# Patient Record
Sex: FEMALE | Race: WHITE | NOT HISPANIC OR LATINO | Employment: UNEMPLOYED | URBAN - METROPOLITAN AREA
[De-identification: names, ages, dates, MRNs, and addresses within clinical notes are randomized per-mention and may not be internally consistent; named-entity substitution may affect disease eponyms.]

---

## 2021-03-03 DIAGNOSIS — Z23 NEED FOR VACCINATION: ICD-10-CM

## 2022-01-24 ENCOUNTER — HOSPITAL ENCOUNTER (EMERGENCY)
Facility: MEDICAL CENTER | Age: 67
End: 2022-01-24
Attending: EMERGENCY MEDICINE
Payer: MEDICARE

## 2022-01-24 VITALS
RESPIRATION RATE: 16 BRPM | WEIGHT: 260 LBS | OXYGEN SATURATION: 98 % | BODY MASS INDEX: 41.78 KG/M2 | HEART RATE: 88 BPM | DIASTOLIC BLOOD PRESSURE: 70 MMHG | SYSTOLIC BLOOD PRESSURE: 135 MMHG | HEIGHT: 66 IN | TEMPERATURE: 97.3 F

## 2022-01-24 DIAGNOSIS — F22 PARANOID DELUSION (HCC): ICD-10-CM

## 2022-01-24 DIAGNOSIS — R45.851 SUICIDAL IDEATION: ICD-10-CM

## 2022-01-24 DIAGNOSIS — Z86.59 HISTORY OF BIPOLAR DISORDER: ICD-10-CM

## 2022-01-24 LAB
AMPHET UR QL SCN: NEGATIVE
BARBITURATES UR QL SCN: NEGATIVE
BENZODIAZ UR QL SCN: NEGATIVE
BZE UR QL SCN: NEGATIVE
CANNABINOIDS UR QL SCN: NEGATIVE
METHADONE UR QL SCN: NEGATIVE
OPIATES UR QL SCN: NEGATIVE
OXYCODONE UR QL SCN: NEGATIVE
PCP UR QL SCN: NEGATIVE
POC BREATHALIZER: 0 PERCENT (ref 0–0.01)
PROPOXYPH UR QL SCN: NEGATIVE
SARS-COV+SARS-COV-2 AG RESP QL IA.RAPID: NOTDETECTED
SPECIMEN SOURCE: NORMAL

## 2022-01-24 PROCEDURE — A9270 NON-COVERED ITEM OR SERVICE: HCPCS | Performed by: EMERGENCY MEDICINE

## 2022-01-24 PROCEDURE — 700102 HCHG RX REV CODE 250 W/ 637 OVERRIDE(OP): Performed by: EMERGENCY MEDICINE

## 2022-01-24 PROCEDURE — 87426 SARSCOV CORONAVIRUS AG IA: CPT

## 2022-01-24 PROCEDURE — 90791 PSYCH DIAGNOSTIC EVALUATION: CPT

## 2022-01-24 PROCEDURE — 99285 EMERGENCY DEPT VISIT HI MDM: CPT

## 2022-01-24 PROCEDURE — 302970 POC BREATHALIZER: Performed by: EMERGENCY MEDICINE

## 2022-01-24 PROCEDURE — 80307 DRUG TEST PRSMV CHEM ANLYZR: CPT

## 2022-01-24 PROCEDURE — 302970 POC BREATHALIZER

## 2022-01-24 RX ORDER — NICOTINE 21 MG/24HR
1 PATCH, TRANSDERMAL 24 HOURS TRANSDERMAL ONCE
Status: DISCONTINUED | OUTPATIENT
Start: 2022-01-24 | End: 2022-01-24 | Stop reason: HOSPADM

## 2022-01-24 RX ORDER — IBUPROFEN 600 MG/1
600 TABLET ORAL ONCE
Status: COMPLETED | OUTPATIENT
Start: 2022-01-24 | End: 2022-01-24

## 2022-01-24 RX ORDER — LORAZEPAM 1 MG/1
1 TABLET ORAL ONCE
Status: COMPLETED | OUTPATIENT
Start: 2022-01-24 | End: 2022-01-24

## 2022-01-24 RX ORDER — HALOPERIDOL 5 MG/1
5 TABLET ORAL ONCE
Status: COMPLETED | OUTPATIENT
Start: 2022-01-24 | End: 2022-01-24

## 2022-01-24 RX ADMIN — HALOPERIDOL 5 MG: 5 TABLET ORAL at 10:24

## 2022-01-24 RX ADMIN — NICOTINE TRANSDERMAL SYSTEM 21 MG: 21 PATCH, EXTENDED RELEASE TRANSDERMAL at 10:21

## 2022-01-24 RX ADMIN — IBUPROFEN 600 MG: 600 TABLET ORAL at 16:27

## 2022-01-24 RX ADMIN — LORAZEPAM 1 MG: 1 TABLET ORAL at 10:24

## 2022-01-24 ASSESSMENT — ENCOUNTER SYMPTOMS
FEVER: 0
ABDOMINAL PAIN: 0
NERVOUS/ANXIOUS: 1
COUGH: 0
DEPRESSION: 1
HEADACHES: 0

## 2022-01-24 NOTE — DISCHARGE PLANNING
Legal Hold Transfer     Referral: Legal hold transfer to Mental Health Facility     Intervention: Notified by  Bernardo that patient has been accepted to [facility]     Pt's accepting physcian is Dr. No     Transport arranged through [facility]     The pt will be picked up at 1800      Notified Bedside RN, Alert Team RN and ALVIN Chandler of the departure time as well as accepting facility.      Transfer packet created with original legal hold and placed on chart.      Plan: Pt will be transferring to [facility] today at 1800 via [facility].

## 2022-01-24 NOTE — ED NOTES
Security made this RN aware. Pt's belongings total 3 bags. One pt belongings bag/purse of some sort and luggage bag in and outside of locker 11.

## 2022-01-24 NOTE — ED TRIAGE NOTES
Chief Complaint   Patient presents with   • Suicidal Ideation     [place] that [place] was attempting to fly to [place], paraniod thoughts and SI.

## 2022-01-24 NOTE — CONSULTS
"[facility]   TRIAGE ASSESSMENT    Name: Coty Baptiste  MRN: [id number]  : 1955  Age: 66 y.o.  Date of assessment: 2022  PCP: Pcp Pt States None  Persons in attendance: Patient  Patient Location: [facility]    CHIEF COMPLAINT/PRESENTING ISSUE  Chief Complaint   Patient presents with   • Suicidal Ideation     [place] report that pt was attempting to fly to [place], paraniod thoughts and SI.        Per LH initiated by PD for inability to care for self:  \"Female reported her  worked for [employer] and she needed to 'disappear.'  Female stated she needed to fly to [place].  Spoke with subjects daughter and ex , who advised subject was bipolar and had similar incidents of traveling to [place] and [place] and going missing for days.  Subject was unable to purchase an airline ticket and advised if she was unable to travel she or someone else would end up dead.  Subject advised she had the means to harm herself or others and would do so if it came to it.  Subject suffering for auditory hallucinations and believes her phone and bank account are being hacked.  Subject asked me multiple times to put a bullet in her head.\"  Pt noted to make \"erratic statements\" to bedside RN, required redirection to not wander from her ER room and to wear her mask.     Upon my evaluation, pt tangential and paranoid; a+ox4, denies SI, HI and hallucinations.    Attempted to discuss LH with pt, who said the police who wrote it are lying and \"in on it.\"  She references several times during our discussion that she is the target of police working with malevolent forces against her. Says she and her family are targets d/t her sister being in [employer], something she also mentioned multiple times.  She was unable to tell me how she tends to basic tenets of caring for herself; too paranoid and tangential to answer most of my questions succinctly.  She would " "not answer when I asked her about asking the officer to shoot her, only saying \"he's lying.  He's in on and I guess you are too.\"    CURRENT LIVING SITUATION/SOCIAL SUPPORT/FINANCIAL RESOURCES: Pt currently a poor and reluctant historian; paranoid delusions.  Address on facesheet lists address in [place] and unemployed.    Of note, from chart review:  Minimal past encounters in this EMR.  First encounter 2004; presented to ER with multiple medical complaints, including chest pain.  Said [facility] [employer] [facility].  \" She states also that she has been  exposed to mold at the [facility], they had three patient's die at  the [facility] after being in a special room and now they are investigating  the room and that part of the hospital for mold.  She wanted us to check  her for mold.  The other thing is she said that she was exposed to somebody  with TB recently and she is supposed to get a TB test for this and  requested us to do that.  She also stated that she was not sure whether she  was supposed to be in the Emergency Department or not, that she had been  told that she was supposed to go to [facility] for these things, but  she opted today to come to the Emergency Department.\"  Np psych hx noted.  Eloped.  2015: ER for medical.  Hx anxiety noted; no psych meds in home med list.    BEHAVIORAL HEALTH/SUBSTANCE USE TREATMENT HISTORY  Does patient/parent report a history of prior behavioral health/substance use treatment for patient?   None noted in minimal past charting.  Pt currently too paranoid to divulge much personal hx.  Pt's daughter reported to PD pt has hx of bipolar d/o and has had past similar incidents to today.    SAFETY ASSESSMENT - SELF  Does patient acknowledge current or past symptoms of dangerousness to self or is previous history noted? Pt refused to acknowledge.  Does parent/significant other report patient has current or past symptoms of dangerousness to " "self? Yes; per LH by PD, pt threatened to harm herself and asked officer several times to shoot her.  Does presenting problem suggest symptoms of dangerousness to self? Yes:    Pt currently unable/unwilling to give background information; she says PD is lying about her voicing SI, that they are part of some sort of cover up.    SAFETY ASSESSMENT - OTHERS  Does patient acknowledge current or past symptoms of aggressive behavior or risk to others or is previous history noted? no  Does parent/significant other report patient has current or past symptoms of aggressive behavior or risk to others?  Yes, per LH pt voiced vague ideation to harm others \"if it came down to it\" r/t her delusion about PD.  Does presenting problem suggest symptoms of dangerousness to others? Vaguely; no direct threats and she denies it completely; no  needed at this time.    LEGAL HISTORY  Does patient acknowledge history of arrest/correction/[facility] or is previous history noted? Not assessed    Crisis Safety Plan completed and copy given to patient? N\A    ABUSE/NEGLECT SCREENING  Does patient report feeling “unsafe” in his/her home, or afraid of anyone?  Yes; noted likely paranoia  Does patient report any history of physical, sexual, or emotional abuse?  Not assessed  Does parent or significant other report any of the above? N\A  Is there evidence of neglect by self?  no  Is there evidence of neglect by a caregiver? n/a  Does the patient/parent report any history of CPS/APS/police involvement related to suspected abuse/neglect or domestic violence? no  Based on the information provided during the current assessment, is a mandated report of suspected abuse/neglect being made?  No    SUBSTANCE USE SCREENING  Pt denies.  etoh 0.0  UDS not sent yet      MENTAL STATUS   Participation: Limited verbal participation, Guarded, Defensive and Resistant  Grooming: Casual  Orientation: Alert, Fully Oriented and Evidence of delusions " present  Behavior: Agitated and Tense  Eye contact: Intense  Mood: Anxious, Angry, Manic and Irritable  Affect: Anxious and Angry  Thought process: Tangential, Flight of ideas and Perseveration  Thought content: Preoccupation, Rumination, Evidence of delusion and Paranoia  Speech: Volume within normal limits, Pressured and Hypertalkative  Perception: Within normal limits  Memory:  Poor memory for chronology of events  Insight: Poor  Judgment:  Poor  Other:    Collateral information:   Source:  [] Significant other present in person:   [] Significant other by telephone  [] [facility]   [] [facility]  [x] [facility]  [x] Other: legal hold from PD      CLINICAL IMPRESSIONS:  Primary:  Unable to care for self  Secondary:  SI       IDENTIFIED NEEDS/PLAN:  [Trigger DISPOSITION list for any items marked]    [x]  Imminent safety risk - self [] Imminent safety risk - others   []  Acute substance withdrawal [x]  Psychosis/Impaired reality testing   [x]  Mood/anxiety []  Substance use/Addictive behavior   [x]  Maladaptive behaviro []  Parent/child conflict   [x]  Family/Couples conflict []  Biomedical   []  Housing []  Financial   []   Legal  Occupational/Educational   []  Domestic violence []  Other:     Recommended Plan of Care:  Actively being addressed by Legal Hold and [facility] and 1:1 Observation   Pt is both SI and elopement risk and should have 1:1  *Telesitter may not be utilized for moderate or high risk patients    Has the Recommended Plan of Care/Level of Observation been reviewed with the patient's assigned nurse? yes    Does patient/parent or guardian express agreement with the above plan? no    Referral appointment(s) scheduled? N\A    Alert team only: Pt to remain on LH initiated by PD.  I have discussed findings and recommendations with Dr. Garza,  who is in agreement with these recommendations.     Referral information sent to the following outpatient community  providers :  N/A; uninsured.  Email sent to [facility] to notify.    Referral information sent to the following inpatient community providers : see DC planning notes.      Alisha Padron R.N.  1/24/2022

## 2022-01-24 NOTE — DISCHARGE PLANNING
[facility] ED Behavioral Health Fax Referral      [facility] ED Behavioral Health Alert Team:  [phone]    Referral: Legal Hold    Intervention: Patient referral to [facility] inpatient  facillity    Legal Hold Initiated: Date: 1/24/22 Time: 1120    Patient’s Insurance Listed on Face Sheet: [employer]    Referrals sent to: [facility]     Referrals faxed by Bernardo Gordon    This referral contains the following information:  1) Face sheet __x__  2) Page 1 and Page 2 of Legal Hold __x__  3) Alert Team Assessment/Psych Assessment _x___  4) Head to toe physical exam __x__  5) Urine Drug Screen ____(Pending)  6) Blood Alcohol __x__  7) Vital signs _x___  8) Pregnancy test when applicable _n/a__  9) Medications list _x___  10) Covid screening ____    Plan: Patient will transfer to mental health facility once acceptance is obtained

## 2022-01-24 NOTE — DISCHARGE PLANNING
Alert Team Note:    Spoke to Michael at [facility]. Pt has been accepted. Accepting is Dr. No. They would prefer the pt be transferred around 1800. Notified Jennifer Saucedo
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
## 2022-01-24 NOTE — ED NOTES
Pt found wondering to bathroom, escorted back to rm. Pt making multiple erratic statements but for the most part cooperative.

## 2022-01-24 NOTE — ED NOTES
Med rec updated and complete. Allergies reviewed and updated. Pt able to state name and date of birth. Pt denies taking medications. Pt denies have a local pharmacy.

## 2022-01-25 NOTE — ED NOTES
Pt sitting up in chair, reading own book, eating meal/snack. Cooperative, under direct obs of designee. .

## 2022-01-25 NOTE — ED NOTES
Appropriate paperwork transferred with EMS, pt aware of POC. Pt transferred with belongings, pt confirmed correct belongings.

## 2022-11-03 ENCOUNTER — PATIENT MESSAGE (OUTPATIENT)
Dept: HEALTH INFORMATION MANAGEMENT | Facility: OTHER | Age: 67
End: 2022-11-03